# Patient Record
Sex: FEMALE | Race: WHITE | NOT HISPANIC OR LATINO | Employment: PART TIME | URBAN - METROPOLITAN AREA
[De-identification: names, ages, dates, MRNs, and addresses within clinical notes are randomized per-mention and may not be internally consistent; named-entity substitution may affect disease eponyms.]

---

## 2019-12-02 ENCOUNTER — HOSPITAL ENCOUNTER (EMERGENCY)
Facility: HOSPITAL | Age: 64
Discharge: HOME | End: 2019-12-02
Attending: EMERGENCY MEDICINE
Payer: MEDICARE

## 2019-12-02 VITALS
HEART RATE: 68 BPM | WEIGHT: 139 LBS | HEIGHT: 63 IN | DIASTOLIC BLOOD PRESSURE: 71 MMHG | RESPIRATION RATE: 18 BRPM | OXYGEN SATURATION: 97 % | TEMPERATURE: 98 F | BODY MASS INDEX: 24.63 KG/M2 | SYSTOLIC BLOOD PRESSURE: 161 MMHG

## 2019-12-02 DIAGNOSIS — F17.200 TOBACCO USE DISORDER: ICD-10-CM

## 2019-12-02 DIAGNOSIS — M54.9 MUSCULOSKELETAL BACK PAIN: Primary | ICD-10-CM

## 2019-12-02 LAB
ALBUMIN SERPL BCP-MCNC: 4.1 G/DL (ref 3.5–5.2)
ALP SERPL-CCNC: 109 U/L (ref 38–126)
ALT SERPL W/O P-5'-P-CCNC: 23 U/L (ref 10–44)
ANION GAP SERPL CALC-SCNC: 7 MMOL/L (ref 8–16)
AST SERPL-CCNC: 25 U/L (ref 15–46)
BASOPHILS # BLD AUTO: 0.08 K/UL (ref 0–0.2)
BASOPHILS NFR BLD: 1 % (ref 0–1.9)
BILIRUB SERPL-MCNC: 0.4 MG/DL (ref 0.1–1)
BILIRUB UR QL STRIP: NEGATIVE
BUN SERPL-MCNC: 19 MG/DL (ref 7–17)
CALCIUM SERPL-MCNC: 9.7 MG/DL (ref 8.7–10.5)
CHLORIDE SERPL-SCNC: 106 MMOL/L (ref 95–110)
CLARITY UR REFRACT.AUTO: CLEAR
CO2 SERPL-SCNC: 26 MMOL/L (ref 23–29)
COLOR UR AUTO: NORMAL
CREAT SERPL-MCNC: 0.83 MG/DL (ref 0.5–1.4)
DIFFERENTIAL METHOD: ABNORMAL
EOSINOPHIL # BLD AUTO: 0.3 K/UL (ref 0–0.5)
EOSINOPHIL NFR BLD: 3.5 % (ref 0–8)
ERYTHROCYTE [DISTWIDTH] IN BLOOD BY AUTOMATED COUNT: 13.2 % (ref 11.5–14.5)
EST. GFR  (AFRICAN AMERICAN): >60 ML/MIN/1.73 M^2
EST. GFR  (NON AFRICAN AMERICAN): >60 ML/MIN/1.73 M^2
GLUCOSE SERPL-MCNC: 104 MG/DL (ref 70–110)
GLUCOSE UR QL STRIP: NEGATIVE
HCT VFR BLD AUTO: 49.5 % (ref 37–48.5)
HGB BLD-MCNC: 16 G/DL (ref 12–16)
HGB UR QL STRIP: NEGATIVE
KETONES UR QL STRIP: NEGATIVE
LEUKOCYTE ESTERASE UR QL STRIP: NEGATIVE
LYMPHOCYTES # BLD AUTO: 1.8 K/UL (ref 1–4.8)
LYMPHOCYTES NFR BLD: 23.8 % (ref 18–48)
MCH RBC QN AUTO: 31.7 PG (ref 27–31)
MCHC RBC AUTO-ENTMCNC: 32.3 G/DL (ref 32–36)
MCV RBC AUTO: 98 FL (ref 82–98)
MONOCYTES # BLD AUTO: 0.6 K/UL (ref 0.3–1)
MONOCYTES NFR BLD: 8.3 % (ref 4–15)
NEUTROPHILS # BLD AUTO: 4.8 K/UL (ref 1.8–7.7)
NEUTROPHILS NFR BLD: 63.4 % (ref 38–73)
NITRITE UR QL STRIP: NEGATIVE
PH UR STRIP: 5 [PH] (ref 5–8)
PLATELET # BLD AUTO: 213 K/UL (ref 150–350)
PMV BLD AUTO: 10.2 FL (ref 9.2–12.9)
POTASSIUM SERPL-SCNC: 4.2 MMOL/L (ref 3.5–5.1)
PROT SERPL-MCNC: 7.5 G/DL (ref 6–8.4)
PROT UR QL STRIP: NEGATIVE
RBC # BLD AUTO: 5.04 M/UL (ref 4–5.4)
SODIUM SERPL-SCNC: 139 MMOL/L (ref 136–145)
SP GR UR STRIP: 1.01 (ref 1–1.03)
URN SPEC COLLECT METH UR: NORMAL
UROBILINOGEN UR STRIP-ACNC: NEGATIVE EU/DL
WBC # BLD AUTO: 7.69 K/UL (ref 3.9–12.7)

## 2019-12-02 PROCEDURE — 99284 EMERGENCY DEPT VISIT MOD MDM: CPT | Mod: 25,ER

## 2019-12-02 PROCEDURE — 80053 COMPREHEN METABOLIC PANEL: CPT | Mod: ER

## 2019-12-02 PROCEDURE — 85025 COMPLETE CBC W/AUTO DIFF WBC: CPT | Mod: ER

## 2019-12-02 PROCEDURE — 81003 URINALYSIS AUTO W/O SCOPE: CPT | Mod: ER

## 2019-12-02 RX ORDER — METHOCARBAMOL 500 MG/1
500-1000 TABLET, FILM COATED ORAL 3 TIMES DAILY PRN
Qty: 20 TABLET | Refills: 0 | Status: SHIPPED | OUTPATIENT
Start: 2019-12-02

## 2019-12-02 NOTE — ED NOTES
Adult Physical Assessment  LOC: Malika Garcia, 64 y.o. female verified via two identifiers.  The patient is awake, alert, oriented and speaking appropriately at this time.  APPEARANCE: Patient resting comfortably and appears to be in no acute distress at this time. Patient is clean and well groomed, patient's clothing is properly fastened.  SKIN:The skin is warm and dry, color consistent with ethnicity, patient has normal skin turgor and moist mucus membranes, skin intact, no breakdown or brusing noted.  MUSCULOSKELETAL: Patient moving all extremities well, no obvious swelling or deformities noted. Reports flank pain.  RESPIRATORY: Airway is open and patent, respirations are spontaneous, patient has a normal effort and rate, no accessory muscle use noted.  CARDIAC: Patient has a normal rate and rhythm, no periphreal edema noted in any extremity, capillary refill < 3 seconds in all extremities  ABDOMEN: Soft and non tender to palpation, no abdominal distention noted. Bowel sounds present in all four quadrants.  NEUROLOGIC: Eyes open spontaneously, behavior appropriate to situation, follows commands, facial expression symmetrical, bilateral hand grasp equal and even, purposeful motor response noted, normal sensation in all extremities when touched with a finger.

## 2019-12-02 NOTE — ED PROVIDER NOTES
Encounter Date: 12/2/2019       History     Chief Complaint   Patient presents with    Flank Pain     Pt c/o bilateral flank pain X 1 day.  Pt reports history + kidney stones with L nephrectomy in 1987. Pt denies GI/ symptoms.     This patient is a 64-year-old female.  Presents to the emergency department complaining of right flank pain, radiating into the right lower quadrant.  Started 2 days ago.  Has been persistent, no associated nausea vomiting fevers or chills.  No dysuria or hematuria.  The patient said that this feels the same as when she had a kidney stone years ago.  Her 1st episode was in the 1980s, she had several stones, ultimately developed an obstruction of her left ureter required a pyeloplasty, developed a stricture, and infection, then an abscess, and required a nephrectomy.  She is followed by Dr. Recio in [place].        Review of patient's allergies indicates:  Allergies not on file  Past Medical History:   Diagnosis Date    H/O left nephrectomy      History reviewed. No pertinent surgical history.  History reviewed. No pertinent family history.  Social History     Tobacco Use    Smoking status: Current Every Day Smoker     Packs/day: 1.00   Substance Use Topics    Alcohol use: Not Currently    Drug use: Never     Review of Systems   All other systems reviewed and are negative.      Physical Exam     Initial Vitals [12/02/19 0158]   BP Pulse Resp Temp SpO2   (!) 171/87 72 19 97.8 °F (36.6 °C) 97 %      MAP       --         Physical Exam    Nursing note and vitals reviewed.  Constitutional: She appears well-developed and well-nourished. She is not diaphoretic. No distress.   HENT:   Head: Normocephalic and atraumatic.   Right Ear: External ear normal.   Left Ear: External ear normal.   Nose: Nose normal.   Mouth/Throat: Oropharynx is clear and moist.   Eyes: Conjunctivae and EOM are normal. Pupils are equal, round, and reactive to light.   Neck: No JVD present.   Cardiovascular: Normal  rate, regular rhythm, normal heart sounds and intact distal pulses.   No murmur heard.  Pulmonary/Chest: Breath sounds normal. No stridor. No respiratory distress.   Abdominal: Soft. She exhibits no distension. There is no tenderness.   Right CVA tenderness   Musculoskeletal: She exhibits no edema or tenderness.   Neurological: She is alert. She has normal strength. No sensory deficit.   Skin: Skin is warm and dry.   Psychiatric: She has a normal mood and affect.         ED Course   Procedures  Labs Reviewed   CBC W/ AUTO DIFFERENTIAL - Abnormal; Notable for the following components:       Result Value    Hematocrit 49.5 (*)     Mean Corpuscular Hemoglobin 31.7 (*)     All other components within normal limits   COMPREHENSIVE METABOLIC PANEL - Abnormal; Notable for the following components:    BUN, Bld 19 (*)     Anion Gap 7 (*)     All other components within normal limits   URINALYSIS, REFLEX TO URINE CULTURE    Narrative:     Preferred Collection Type->Urine, Clean Catch          Imaging Results          CT Renal Stone Study ABD Pelvis WO (In process)                  Medical Decision Making:   Initial Assessment:   This patient presents with right-sided flank/low back pain. Worse when she lies down flat moves or twists to the side.  No associated hematuria nausea or vomiting.  The patient's symptoms are suggestive of musculoskeletal pain, but because she reports that the symptoms were identical to when she had a complicated course of urolithiasis that ultimately led to a left nephrectomy in the past, I felt that imaging was warranted to ensure that her remaining kidney is not at risk.  A CT scan was obtained, without contrast, and shows no evidence of urolithiasis.  I will place her on Robaxin, recommended use of tramadol for pain avoid nonsteroidals, follow up with primary care physician if not better in a week.  Also discussed smoking cessation with her.                                 Clinical Impression:        ICD-10-CM ICD-9-CM   1. Musculoskeletal back pain M54.9 724.5   2. Tobacco use disorder F17.200 305.1         Disposition:   Disposition: Discharged  Condition: Stable                     Abad Vivas MD  12/02/19 0416

## 2019-12-02 NOTE — DISCHARGE INSTRUCTIONS
"Do not drive or operate machinery when using pain medication, muscle relaxer, or other sedating medication due to sedative effects.  These medications impair your ability to drive safely.      The following important information is being provided as a requirement a [place] law.  Please read and follow these instructions in their entirety.  Ask your Doctor, Nurse, or Pharmacist if you have any questions.    You have been prescribed a pain medication which belongs to the class of drugs called opiates/opioids.  This medication, and all opiates, have a high potential for dependence and addiction.  Addiction can occur in some individuals even when this medication is used properly, as prescribed, for a short period of time.  The risk of addiction increases when the medication is used for longer periods of time.  Therefore this medication should be used only to control pain and to bring it to a tolerable level.  You should discontinue use of the medication as soon as the painful condition has improved or resolved.    Never take more of the medication than is prescribed for you.  You should not increase the dose, nor increase the frequency of use, without approval of your doctor.  Excessive use of pain medication can lead to overdose, which can cause you to lose consciousness, stop breathing, and can be fatal.    Pain medications interact with many other medications.  Taking both a pain medication and another sedative can lead to over-sedation, loss of consciousness, overdose, and death.  If you are prescribed or are taking any other sedating medications, tell your doctor before you start a pain medication.  Examples of other sedating medications include muscle relaxers, anxiety medications, benzodiazepines, sleeping pills, promethazine (phenergan), cough syrup (particularly those with alcohol and or dextromethorphan "DM"), and some medications for seizures.    Do not consume alcohol (beer, wine, liquor) when using pain " "medication or any other sedating medication, because the combination with alcohol can lead to overdose and death.    You may not drive or operate dangerous machinery or equipment when using pain medication (or other sedatives), because they can impair your ability to drive safely.    The prescription that has been provided for you does not have to be filled for the full amount.  You can obtain a partial fill for the prescription at your pharmacy, and obtain the remainder of the medication later if it is needed.    Never share or allow anyone else to use this pain medication, or any other controlled substances that are prescribed for you.  Likewise, you should never use pain medication or controlled substances that are prescribed for someone else.  Keep this medication out of reach of children.  It should be kept in a safe, secure, locked location so that access cannot be obtained by someone else.    "Leftover" pain medicine (or other controlled medications) should be disposed of properly.  The [place] Food and Drug Administration (FDA) and the [place] Drug Enforcement Administration (NANCY) recommend disposal through a [facility] authorized , which can be located by calling 1-992.147.6164.  The [place] and FDA also provide information on their web sites with detailed instructions for safe medication disposal.    If you feel that you are becoming dependent on or addicted to pain medication, talk to your doctor right away for guidance and help.  "

## 2023-11-08 NOTE — ED NOTES
EMERGENCY DEPARTMENT ENCOUNTER    Room Number:  05/05  PCP: Tho Mra MD  Historian: patient      HPI:  Chief Complaint: weight gain, soa  A complete HPI/ROS/PMH/PSH/SH/FH are unobtainable due to: nothing  Context: Alice Mabry is a pleasant afebrile 81 y.o.  female who presents to the ED c/o shortness of breath.     She reports a history of congestive heart failure and atrial fibrillation. She states she dropped out of afib a week ago and her heart rate has been in the 40s since  She reports that she is chronically short of breath but her shortness of breath  became worse last night and today, she reports a weight gain of 4lb in the past 2 days  Took her bumex and spironolactone this morning  States when she was seen by APRN with cardiology she was taken off 2 meds, cannot recall which they were      PAST MEDICAL HISTORY  Active Ambulatory Problems     Diagnosis Date Noted    CAD (coronary artery disease) 07/12/2017    Cardiomyopathy 07/12/2017    Mitral valve insufficiency 07/12/2017    HFrEF (heart failure with reduced ejection fraction) 07/27/2021    Nonrheumatic aortic valve stenosis 09/30/2021    Sleep apnea-like behavior 11/24/2021    Dyspnea on exertion 03/10/2022    Abnormal findings on diagnostic imaging of heart and coronary circulation 03/23/2022    Coronary artery disease of native heart with stable angina pectoris, unspecified vessel or lesion type 03/28/2022    Recent cerebrovascular accident (CVA) 04/06/2022    Hypokalemia 04/06/2022    Anemia 04/06/2022    Confusion 04/06/2022    Right kidney mass 04/06/2022    Type 2 diabetes mellitus 04/06/2022    Valvular heart disease 04/06/2022    Essential hypertension 04/06/2022    S/P CABG (coronary artery bypass graft) 04/14/2022    S/P AVR 04/14/2022    Right renal mass 11/03/2022    Acute hypoxemic respiratory failure 05/20/2023    Coronavirus infection 05/21/2023    Physical deconditioning 05/21/2023    Persistent atrial  Physician at bedside.     fibrillation 07/06/2023     Resolved Ambulatory Problems     Diagnosis Date Noted    History of coronary artery stent placement 07/12/2017    Chronic diastolic (congestive) heart failure 07/19/2019    Atrial fibrillation with RVR 07/06/2021    Stable angina pectoris 07/06/2021    Acute diastolic congestive heart failure 07/08/2021    Paroxysmal atrial fibrillation 07/20/2021     Past Medical History:   Diagnosis Date    Acute diastolic (congestive) heart failure     Anxiety     Aortic valve stenosis     Arthritis     Depression     Dizziness     Fatty liver     GERD (gastroesophageal reflux disease)     H/O blood clots     H/O Clostridium difficile infection     Heart murmur     History of atrial fibrillation     History of cervical cancer     History of MI (myocardial infarction) 1999    History of transfusion     [place] (hard of hearing)     Hyperlipidemia     Hypertension     IBS (irritable bowel syndrome)     Incontinence of urine     Renal mass, right          PAST SURGICAL HISTORY  Past Surgical History:   Procedure Laterality Date    APPENDECTOMY  1960    CARDIAC CATHETERIZATION  06/10/2014    Dr. Murphy Horner    CARDIAC CATHETERIZATION  11/13/2007    Dr. Murphy Horner    CARDIAC CATHETERIZATION N/A 10/19/2004    Dr. Murphy Horner    CARDIAC CATHETERIZATION N/A 07/15/2004    Dr. Gregorio Gonzales    CARDIAC CATHETERIZATION  10/2003    Dr. Murphy Horner    CARDIAC CATHETERIZATION N/A 03/21/2022    Procedure: Coronary angiography;  Surgeon: Murphy Horner MD;  Location:  [facility];  Service: Cardiology;  Laterality: N/A;    CARDIAC CATHETERIZATION N/A 03/21/2022    Procedure: Right Heart Cath;  Surgeon: Murphy Horner MD;  Location:  [facility];  Service: Cardiology;  Laterality: N/A;    CARDIAC CATHETERIZATION N/A 03/21/2022    Procedure: Left Heart Cath;  Surgeon: Murphy Horner MD;  Location:  [facility];  Service: Cardiology;  Laterality: N/A;     CARDIAC CATHETERIZATION N/A 03/21/2022    Procedure: Left ventriculography;  Surgeon: Murphy Horner MD;  [facility];  Service: Cardiology;  Laterality: N/A;    CARDIAC CATHETERIZATION N/A 4/13/2023    Procedure: Right Heart Cath;  Surgeon: Murphy Horner MD;  Location:  [facility];  Service: Cardiology;  Laterality: N/A;    CHOLECYSTECTOMY  1998    COLONOSCOPY N/A 07/2001    negative    COLONOSCOPY N/A 02/28/2012    negative    CORONARY ANGIOPLASTY WITH STENT PLACEMENT N/A 07/15/2004    Dr. Murphy Horner    CORONARY ANGIOPLASTY WITH STENT PLACEMENT  03/2002    to RCA    CORONARY ARTERY BYPASS GRAFT WITH AORTIC AND MITRAL VALVE REPAIR/REPLACEMENT N/A 03/28/2022    Procedure: [place] STERNOTOMY CORONARY ARTERY BYPASS GRAFTING TIMES 4 USING LEFT INTERNAL MAMMARY ARTERY AND LEFT GREATER SAPHENOUS VEIN GRAFT PER ENDOSCOPIC VEIN HARVESTING, RIGHT CORONARY ENDARTERECTOMY, AORTIC VALVE REPLACEMENT, MITRAL VALVE REPAIR, ATRICURE MAZE PROCEDURE, CLOSURE OF LEFT ATRIAL APPENDAGE AND PRP;  Surgeon: Jr Isaias Lynn MD;  [facility];  Service: Cardiothoracic;    CORONARY ARTERY BYPASS GRAFT WITH AORTIC AND MITRAL VALVE REPAIR/REPLACEMENT  03/28/2022    Procedure: ;  Surgeon: Jr Isaias Lynn MD;  Location: [facility];  Service: Cardiothoracic;;    HYSTERECTOMY  1974    LUMBAR DISCECTOMY      NEPHRECTOMY PARTIAL Right 11/3/2022    Procedure: RIGHT ROBOTIC PARTIAL NEPHRECTOMY;  Surgeon: Davy Silva Jr., MD;  Location: [facility] OR;  Service: [facility] - [facility];  Laterality: Right;    UPPER GASTROINTESTINAL ENDOSCOPY N/A 04/20/2015    Mild Schatzki ring, hiatus hernia, non-bleeding erosive gastropathy, erythematous mucosa in the antrum, normal duodenum-Dr. Alex Gill         FAMILY HISTORY  Family History   Problem Relation Age of Onset    Heart disease Mother     No Known Problems Father     Heart disease Sister     Heart disease Brother     No  Known Problems Maternal Grandmother     No Known Problems Maternal Grandfather     No Known Problems Paternal Grandmother     No Known Problems Paternal Grandfather     [place] Hyperthermia Neg Hx     Drug abuse Neg Hx          SOCIAL HISTORY  Social History     Socioeconomic History    Marital status:    Tobacco Use    Smoking status: Former     Packs/day: 1.00     Years: 20.00     Additional pack years: 0.00     Total pack years: 20.00     Types: Cigarettes     Quit date:      Years since quittin.8    Smokeless tobacco: Never    Tobacco comments:     Caffeine - half and half coffee   Vaping Use    Vaping Use: Never used   Substance and Sexual Activity    Alcohol use: Not Currently    Drug use: Never    Sexual activity: Defer         ALLERGIES  Codeine, Penicillins, and Ambien [zolpidem tartrate]        REVIEW OF SYSTEMS  Review of Systems   Respiratory:  Positive for shortness of breath.         PHYSICAL EXAM  ED Triage Vitals [23 1013]   Temp Heart Rate Resp BP SpO2   -- (!) 46 19 -- 97 %      Temp src Heart Rate Source Patient Position BP Location FiO2 (%)   Tympanic Monitor -- -- --       Physical Exam      GENERAL: Alert and oriented x4, no acute distress  HENT: nares patent, mucous membranes are moist and intact  EYES: no scleral icterus, no injection  CV: Bradycardic rhythm, normal rate, no peripheral edema, no murmurs  RESPIRATORY: normal effort, clear to auscultation all fields bilaterally  ABDOMEN: soft and nontender diffusely, no rebound or guarding  MUSCULOSKELETAL: no deformity  NEURO: alert, moves all extremities, follows commands  PSYCH:  calm, cooperative  SKIN: warm, dry and intact    Vital signs and nursing notes reviewed.          LAB RESULTS  Recent Results (from the past 24 hour(s))   ECG 12 Lead Dyspnea    Collection Time: 23 10:18 AM   Result Value Ref Range    QT Interval 603 ms    QTC Interval 504 ms   Comprehensive Metabolic Panel    Collection Time: 23  10:41 AM    Specimen: Blood   Result Value Ref Range    Glucose 125 (H) 65 - 99 mg/dL    BUN 24 (H) 8 - 23 mg/dL    Creatinine 1.58 (H) 0.57 - 1.00 mg/dL    Sodium 143 136 - 145 mmol/L    Potassium 3.8 3.5 - 5.2 mmol/L    Chloride 103 98 - 107 mmol/L    CO2 25.5 22.0 - 29.0 mmol/L    Calcium 9.0 8.6 - 10.5 mg/dL    Total Protein 7.4 6.0 - 8.5 g/dL    Albumin 4.0 3.5 - 5.2 g/dL    ALT (SGPT) 48 (H) 1 - 33 U/L    AST (SGOT) 52 (H) 1 - 32 U/L    Alkaline Phosphatase 80 39 - 117 U/L    Total Bilirubin 0.9 0.0 - 1.2 mg/dL    Globulin 3.4 gm/dL    A/G Ratio 1.2 g/dL    BUN/Creatinine Ratio 15.2 7.0 - 25.0    Anion Gap 14.5 5.0 - 15.0 mmol/L    eGFR 32.4 (L) >60.0 mL/min/1.73   Single High Sensitivity Troponin T    Collection Time: 11/08/23 10:41 AM    Specimen: Blood   Result Value Ref Range    HS Troponin T 38 (H) <14 ng/L   BNP    Collection Time: 11/08/23 10:41 AM    Specimen: Blood   Result Value Ref Range    proBNP 6,015.0 (H) 0.0 - 1,800.0 pg/mL   Protime-INR    Collection Time: 11/08/23 10:41 AM    Specimen: Blood   Result Value Ref Range    Protime 17.7 (H) 11.7 - 14.2 Seconds    INR 1.43 (H) 0.90 - 1.10   CBC Auto Differential    Collection Time: 11/08/23 10:41 AM    Specimen: Blood   Result Value Ref Range    WBC 7.18 3.40 - 10.80 10*3/mm3    RBC 4.74 3.77 - 5.28 10*6/mm3    Hemoglobin 12.3 12.0 - 15.9 g/dL    Hematocrit 39.6 34.0 - 46.6 %    MCV 83.5 79.0 - 97.0 fL    MCH 25.9 (L) 26.6 - 33.0 pg    MCHC 31.1 (L) 31.5 - 35.7 g/dL    RDW 15.7 (H) 12.3 - 15.4 %    RDW-SD 47.8 37.0 - 54.0 fl    MPV 11.0 6.0 - 12.0 fL    Platelets 211 140 - 450 10*3/mm3    Neutrophil % 66.4 42.7 - 76.0 %    Lymphocyte % 21.2 19.6 - 45.3 %    Monocyte % 10.6 5.0 - 12.0 %    Eosinophil % 1.1 0.3 - 6.2 %    Basophil % 0.3 0.0 - 1.5 %    Immature Grans % 0.4 0.0 - 0.5 %    Neutrophils, Absolute 4.77 1.70 - 7.00 10*3/mm3    Lymphocytes, Absolute 1.52 0.70 - 3.10 10*3/mm3    Monocytes, Absolute 0.76 0.10 - 0.90 10*3/mm3    Eosinophils,  Absolute 0.08 0.00 - 0.40 10*3/mm3    Basophils, Absolute 0.02 0.00 - 0.20 10*3/mm3    Immature Grans, Absolute 0.03 0.00 - 0.05 10*3/mm3    nRBC 0.0 0.0 - 0.2 /100 WBC   Green Top (Gel)    Collection Time: 11/08/23 10:41 AM   Result Value Ref Range    Extra Tube Hold for add-ons.    Lavender Top    Collection Time: 11/08/23 10:41 AM   Result Value Ref Range    Extra Tube hold for add-on    Gold Top - SST    Collection Time: 11/08/23 10:41 AM   Result Value Ref Range    Extra Tube Hold for add-ons.    Light Blue Top    Collection Time: 11/08/23 10:41 AM   Result Value Ref Range    Extra Tube Hold for add-ons.        Ordered the above labs and reviewed the results.        RADIOLOGY  XR Chest 1 View    Result Date: 11/8/2023  XR CHEST 1 VW-11/8/2023  HISTORY: CHF protocol.  Heart size is mild to moderately enlarged. Lungs appear clear. Sternotomy wires are seen. There is some aortic calcification.      1. Cardiomegaly.   This report was finalized on 11/8/2023 10:37 AM by Dr. Yunior Loja M.D on Workstation: [employer]       Ordered the above noted radiological studies. Reviewed by me in PACS.            PROCEDURES  Procedures      MEDICATIONS GIVEN IN ER  Medications   sodium chloride 0.9 % flush 10 mL (has no administration in time range)         MEDICAL DECISION MAKING, PROGRESS, and CONSULTS    All labs have been independently reviewed by me.  All radiology studies have been reviewed by me and I have also reviewed the radiology report.   EKG's independently viewed and interpreted by me.  Discussion below represents my analysis of pertinent findings related to patient's condition, differential diagnosis, treatment plan and final disposition.      Additional sources:  - Discussed/ obtained information from independent historians:  hx obtained form pt and spouse    - External (non-ED) record review:     10/19/23 cardiology chance tavares note: Spoke to patient about results.  In addition to holding her atorvastatin I  would also like for her to hold her amiodarone at this time.  She is actually been taking it twice daily and probably only needs to be on once daily in general at this juncture.  She will get repeat labs in 2 weeks.  Additionally I would like for her to have a right upper quadrant ultrasound which I have ordered.     - Chronic or social conditions impacting care: none    - Shared decision making:  discussed test results and tx options with pt and spouse agreeable to admission to the hospital      Orders placed during this visit:  Orders Placed This Encounter   Procedures    XR Chest 1 View    Comprehensive Metabolic Panel    Single High Sensitivity Troponin T    BNP    Protime-INR    [place] Draw    CBC Auto Differential    Strict Intake & Output    Daily Weights    Continuous Pulse Oximetry    Vital Signs    ECG 12 Lead Dyspnea    ECG 12 Lead Dyspnea    Insert Peripheral IV    CBC & Differential    Green Top (Gel)    Lavender Top    Gold Top - SST    Light Blue Top         Additional orders considered but not ordered:  I considered a CTA chest for PE but pt is compliant with eliquis        Differential diagnosis includes but is not limited to:    Acs, tachy-zahra syndrome, chd      Independent interpretation of labs, radiology studies, and discussions with consultants:  ED Course as of 11/08/23 1552   Wed Nov 08, 2023   1114 XR Chest 1 View  Per my independent interpretation is no focal infiltrate, no pneumothorax [AH]   1118 ECG 12 Lead Dyspnea  Per my independent interpretation is sinus bradycardia rate 42, no acute injury pattern, qtc 504 [AH]   1234 I reviewed the EKG today that was done at 1018  I believe that is normal sinus with bradycardia to rate of 42.  There is some mild intravelar conduction delay I do not see any definitive acute injury pattern has some Q waves in the inferior leads  Diffuse nonspecific T wave changes  QT looks mildly prolonged.  I compared this to the prior EKG that was done October  19, 2023 the bradycardia seems to be the most prominent change.  No other obvious changes apparent [MM]   1234 proBNP(!): 6,015.0 [MM]   1234 HS Troponin T(!): 38 [MM]   1234 Creatinine(!): 1.58  Chronic renal failure baseline [MM]   1234 Hemoglobin: 12.3 [MM]   1234 I reviewed the chest x-ray she does have some cardiomegaly but no obvious pleural effusions or obvious failure.  Previous sternotomy. [MM]   1234 I do not believe this patient has a pulmonary embolism.  She is on Eliquis for atrial fibrillation and has been compliant with Eliquis and has not missed any doses. [MM]   1334 Phone call with dr. catherine.  Discussed the patient, relevant history, exam, diagnostics, ED findings/progress, and concerns. They agree to admit to a tele obs bed   [AH]      ED Course User Index  [AH] Oma Andres APRN  [MM] Abhishek Richard MD           I have worn appropriate PPE during this patient encounter, sanitized my hands both with entering and exiting patient's room.      DIAGNOSIS  Final diagnoses:   Dyspnea, unspecified type   Bradycardia         DISPOSITION  Admitte dto cardiology             Latest Documented Vital Signs:  As of 10:24 EST  BP- 133/79 HR- (!) 46 Temp- 97.9 °F (36.6 °C) (Oral) O2 sat- 97%              --    Please note that portions of this were completed with a voice recognition program.       Note Disclaimer: At [facility], we believe that sharing information builds trust and better relationships. You are receiving this note because you are receiving care at [facility] or recently visited. It is possible you will see health information before a provider has talked with you about it. This kind of information can be easy to misunderstand. To help you fully understand what it means for your health, we urge you to discuss this note with your provider.             Oam Anders APRN  11/08/23 1278